# Patient Record
Sex: FEMALE | Race: WHITE
[De-identification: names, ages, dates, MRNs, and addresses within clinical notes are randomized per-mention and may not be internally consistent; named-entity substitution may affect disease eponyms.]

---

## 2024-04-03 ENCOUNTER — TELEPHONE (OUTPATIENT)
Dept: FAMILY MEDICINE CLINIC | Facility: CLINIC | Age: 29
End: 2024-04-03

## 2024-04-03 NOTE — TELEPHONE ENCOUNTER
Pt called stating that she is having horrible abd pain state she has a history of a bad gallbladder and is requesting something be done for this. Pt last visit was a VV on 02/26/21. I have informed the pt that she would have to re-establish care due to being over 3 year since her last visit and none of our providers are currently taking new patient. I have informed the pt if the pain is that bad she will need to go to the ER. Pt is upset that she is unable to see Dr. Dumont or even had to come into the office and request a message be sent to him asking if he can scheduled her to get her gallbladder taken care of because she already been told 4 year ago it had to be removed. I told the pt he will be back in tomorrow and I would send him a message.

## 2024-04-04 NOTE — TELEPHONE ENCOUNTER
I could see the patient on Monday 4/8 at 12:15 overbook, if symptoms worsen before then I would go to the ER and they could scan the gallbladder and determine if surgery needs to see her.